# Patient Record
Sex: MALE | Race: WHITE | NOT HISPANIC OR LATINO | Employment: OTHER | URBAN - METROPOLITAN AREA
[De-identification: names, ages, dates, MRNs, and addresses within clinical notes are randomized per-mention and may not be internally consistent; named-entity substitution may affect disease eponyms.]

---

## 2024-08-15 ENCOUNTER — TELEPHONE (OUTPATIENT)
Dept: VASCULAR SURGERY | Facility: CLINIC | Age: 74
End: 2024-08-15

## 2024-08-15 NOTE — TELEPHONE ENCOUNTER
Called patient's home and left message appointment with Corinne Mitchell has been set up 10/1/2024, at [place], 10:30 am.

## 2024-09-06 ENCOUNTER — TELEPHONE (OUTPATIENT)
Age: 74
End: 2024-09-06

## 2024-09-06 NOTE — TELEPHONE ENCOUNTER
Caller: Suzanna    Doctor: Stephen    Reason for call: Calling to let us know that they will be 5mins late. I informed Suzanna the nursing home received a call last month to reschedule todays appointment since tSephen would not be in the office today. She states she was unaware. New appointment 10/1 will need to be rescheduled but she will call us once she returns to facility.       Phone# [phone]